# Patient Record
Sex: FEMALE | Race: WHITE | NOT HISPANIC OR LATINO | Employment: OTHER | URBAN - METROPOLITAN AREA
[De-identification: names, ages, dates, MRNs, and addresses within clinical notes are randomized per-mention and may not be internally consistent; named-entity substitution may affect disease eponyms.]

---

## 2019-03-16 ENCOUNTER — HOME CARE/HOSPICE - HEALTHEAST (OUTPATIENT)
Dept: HOME HEALTH SERVICES | Facility: HOME HEALTH | Age: 64
End: 2019-03-16

## 2019-03-20 ENCOUNTER — OFFICE VISIT (OUTPATIENT)
Dept: FAMILY MEDICINE | Facility: CLINIC | Age: 64
End: 2019-03-20
Payer: COMMERCIAL

## 2019-03-20 VITALS
WEIGHT: 151 LBS | SYSTOLIC BLOOD PRESSURE: 122 MMHG | DIASTOLIC BLOOD PRESSURE: 74 MMHG | HEART RATE: 71 BPM | OXYGEN SATURATION: 97 % | RESPIRATION RATE: 16 BRPM | TEMPERATURE: 97.7 F

## 2019-03-20 DIAGNOSIS — Z76.89 ENCOUNTER TO ESTABLISH CARE: Primary | ICD-10-CM

## 2019-03-20 DIAGNOSIS — Z09 HOSPITAL DISCHARGE FOLLOW-UP: ICD-10-CM

## 2019-03-20 DIAGNOSIS — R42 VERTIGO: ICD-10-CM

## 2019-03-20 NOTE — PROGRESS NOTES
Preceptor Attestation:   Patient seen, evaluated and discussed with the resident. I have verified the content of the note, which accurately reflects my assessment of the patient and the plan of care.   Supervising Physician:  Abilio Morales MD

## 2019-03-20 NOTE — PROGRESS NOTES
SUBJECTIVE       Gayle Issa is a 64 year old  female with a PMHx significant for   Patient Active Problem List   Diagnosis     Alcohol abuse     Sleep apnea     BPPV (benign paroxysmal positional vertigo)     Carpal tunnel syndrome     Chronic kidney disease     Depressive disorder     DJD (degenerative joint disease)     Essential hypertension     Generalized anxiety disorder     History of bariatric surgery     Mixed hyperlipidemia     Severe recurrent major depression without psychotic features (H)     Who presents today to establish care the for hospital admission follow up.      Gayle was recently admitted to the hospital for ETOH withdrawal, dizziness, and balance issues. Head CTA was negative for acute pathology.  Her symptoms were though to be secondary to either alcohol, BPPV, or hyponatremia, although she continued to have symptoms following correction of sodium and after withdrawing. She was recommended to go to TCU, however, insurance would not cover it so she was discharged to home with home PT/OT. She was prescribed meclizine for the dizziness. She additionally had acute on chronic kidney injury in addition was restarted on her home antihypertensive medications as she was not taking them prior to admission.     Today, she continues to have dizziness at times in addition to trouble focusing. No falls. No headaches. She states she has not drank alcohol since admission. She was initially said alcohol was not a problem for her but then stated that she had a relapse of alcohol prior to admission. She states she has never been through treatment and is not interested because she does not have a problem. In regards to her dizziness, she feels improved with the meclizine but is only taking it once in the morning and not as needed.     She repeatedly asked me for a work note as she states there is no way she can to back to work in the next few days. She had a note from the hospital to give her off  through 3/23/19. She states she cannot focus to do her job currently.     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       ROS: As stated in HPI.    Current medications include:   Current Outpatient Medications   Medication Sig Dispense Refill     albuterol (PROVENTIL HFA) 108 (90 Base) MCG/ACT inhaler Inhale 2 puffs into the lungs       amLODIPine (NORVASC) 10 MG tablet Take 10 mg by mouth       calcium carbonate 500 mg, elemental, (OSCAL 500) 1250 (500 Ca) MG TABS tablet Take 500 mg by mouth       cholecalciferol (VITAMIN D-1000 MAX ST) 1000 units TABS Take 1,000 Units by mouth       cyanocobalamin (VITAMIN B-12) 1000 MCG tablet Take 1,000 mcg by mouth       ferrous sulfate (FEROSUL) 325 (65 Fe) MG tablet Take 325 mg by mouth       folic acid (FOLVITE) 1 MG tablet Take 1 mg by mouth       hydrochlorothiazide (HYDRODIURIL) 25 MG tablet Take 25 mg by mouth       lisinopril (PRINIVIL/ZESTRIL) 20 MG tablet Take 20 mg by mouth       meclizine (ANTIVERT) 25 MG tablet Take 25 mg by mouth       multivitamin (ONE-DAILY) tablet Take 1 tablet by mouth       prochlorperazine (COMPAZINE) 10 MG tablet Take 10 mg by mouth       traZODone (DESYREL) 150 MG tablet Take 150 mg by mouth       varenicline (CHANTIX) 1 MG tablet Take 1 mg by mouth       vitamin B1 (THIAMINE) 100 MG tablet Take 100 mg by mouth       naproxen sodium (ANAPROX) 220 MG tablet Take 220 mg by mouth         PMH, Medications and Allergies were reviewed and updated as needed.        OBJECTIVE     Vitals:    03/20/19 1520   BP: 122/74   Pulse: 71   Resp: 16   Temp: 97.7  F (36.5  C)   TempSrc: Oral   SpO2: 97%   Weight: 68.5 kg (151 lb)     There is no height or weight on file to calculate BMI.    Gen:  Sitting in exam chair, pleasant, NAD  HEENT: Normocephalic, atraumatic. No scleral icterus. Mucous  membranes moist  Neck: Supple. No anterior cervical lymphadenopathy  CV:  RRR. No murmurs, rubs, or gallops. Good peripheral pulses  Pulm:  CTAB, no wheezes, rales, or rhonchi  ABD: Bowel sounds present. Abdomen soft and nontender throughout, no masses or rebound  Extrem: Warm and well perfused. Strength appears normal  Neuro: Alert, oriented to person, place, and time. PERRL. She had a difficult time with extra ocular muscle testing as she couldn't track fully but id did appear extra ocular muscles were intact. Ataxia with finger nose testing. Muscle strength 5/5 bilaterally.   Psych: Appeared anxious. Tangential.     No results found for this or any previous visit (from the past 24 hour(s)).    ASSESSMENT AND PLAN     1. Encounter to establish care  PMHx, PSHx, meds, allergies, and social history reviewed.     2. Hospital discharge follow-up  Recently discharged from [facility] for vergito and ETOH withdrawal. Upon chart review it appears she has more of an alcohol problem than she likes to admit, she has been seen in the ED for alcohol/withdrawal 3 times in the past few months. Wrote her a letter to extend her time off work for 4 more days, until 3/27/2019 given her difficulty with focusing.     3. Vertigo  Her symptoms do improve with meclizine and thus recommended to take this as needed and not just once a day in the morning. She had a head CTA in the hospital did not show any acute pathology, although may consider MR in the future if she does not improve. I do think alcohol or werneke's encephalopathy may be playing a role given ataxia on exam, gait difficulties, and difficulties with concentration. She did receive thiamin while in the hospital and was discharged with thiamin supplements. BPPV may also be playing a role. We discussed to continue the meclizine and thiamin and to have close follow up in one week with a provider from the hospital. She agreed with this plan.       RTC in one week for follow up of  vertigo or sooner if develops new or worsening symptoms. Return precautions discussed.     Discussed with MD Doe Burton, PGY-2  [facility]

## 2019-03-20 NOTE — LETTER
[facility]  [address] [address]  Phone: [phone]  Fax: [phone]    March 20, 2019        Gayle Issa  [address]  [place] [address]          To whom it may concern:    RE: Gayle Issa    Patient was seen and treated today at our clinic and missed work. She was hospitalized from 3/1/ - 3/16. In my medical opinion she can return to work on  3/27/2019.    Please contact me for questions or concerns.      Sincerely,        Doe Koenig MD

## 2019-03-21 ENCOUNTER — DOCUMENTATION ONLY (OUTPATIENT)
Dept: FAMILY MEDICINE | Facility: CLINIC | Age: 64
End: 2019-03-21

## 2019-03-21 PROBLEM — Z98.84 HISTORY OF BARIATRIC SURGERY: Status: ACTIVE | Noted: 2018-11-28

## 2019-03-21 PROBLEM — H81.10 BPPV (BENIGN PAROXYSMAL POSITIONAL VERTIGO): Status: ACTIVE | Noted: 2019-03-14

## 2019-03-21 PROBLEM — N18.9 CHRONIC KIDNEY DISEASE: Status: ACTIVE | Noted: 2019-03-21

## 2019-03-21 PROBLEM — G56.00 CARPAL TUNNEL SYNDROME: Status: ACTIVE | Noted: 2018-11-14

## 2019-03-21 RX ORDER — FOLIC ACID 1 MG/1
1 TABLET ORAL
COMMUNITY
Start: 2019-03-17 | End: 2019-03-27

## 2019-03-21 RX ORDER — NAPROXEN SODIUM 220 MG
220 TABLET ORAL
COMMUNITY

## 2019-03-21 RX ORDER — FERROUS SULFATE 325(65) MG
325 TABLET ORAL
COMMUNITY
Start: 2019-03-16 | End: 2019-03-27

## 2019-03-21 RX ORDER — PROCHLORPERAZINE MALEATE 10 MG
10 TABLET ORAL
COMMUNITY
Start: 2019-03-16 | End: 2019-03-27

## 2019-03-21 RX ORDER — MECLIZINE HYDROCHLORIDE 25 MG/1
25 TABLET ORAL
COMMUNITY
Start: 2019-03-16 | End: 2019-03-27

## 2019-03-21 RX ORDER — VARENICLINE TARTRATE 1 MG/1
1 TABLET, FILM COATED ORAL
COMMUNITY
Start: 2018-11-07

## 2019-03-21 RX ORDER — LISINOPRIL 20 MG/1
20 TABLET ORAL
COMMUNITY
Start: 2018-11-07

## 2019-03-21 RX ORDER — ALBUTEROL SULFATE 90 UG/1
2 AEROSOL, METERED RESPIRATORY (INHALATION)
COMMUNITY
Start: 2019-01-04 | End: 2019-03-27

## 2019-03-21 RX ORDER — TRAZODONE HYDROCHLORIDE 150 MG/1
150 TABLET ORAL
COMMUNITY
Start: 2018-11-07

## 2019-03-21 RX ORDER — IBUPROFEN 200 MG
500 CAPSULE ORAL
COMMUNITY
Start: 2019-03-16 | End: 2019-03-27

## 2019-03-21 RX ORDER — HYDROCHLOROTHIAZIDE 25 MG/1
25 TABLET ORAL
COMMUNITY
Start: 2019-03-16

## 2019-03-21 RX ORDER — LANOLIN ALCOHOL/MO/W.PET/CERES
1000 CREAM (GRAM) TOPICAL
COMMUNITY
Start: 2019-03-16 | End: 2019-03-27

## 2019-03-21 RX ORDER — AMLODIPINE BESYLATE 10 MG/1
10 TABLET ORAL
COMMUNITY
Start: 2019-03-17 | End: 2019-03-27

## 2019-03-21 RX ORDER — LANOLIN ALCOHOL/MO/W.PET/CERES
100 CREAM (GRAM) TOPICAL
COMMUNITY
Start: 2019-03-17 | End: 2019-03-27

## 2019-03-21 RX ORDER — MULTIVITAMIN
1 TABLET ORAL
COMMUNITY
Start: 2019-03-16 | End: 2019-03-27

## 2019-03-21 NOTE — PROGRESS NOTES
Occupational Therapy treatment to begin 3/24/19 for 1w2 cognition and care giver education Ana Koo, OTR/L  [phone]

## 2019-03-27 ENCOUNTER — OFFICE VISIT (OUTPATIENT)
Dept: FAMILY MEDICINE | Facility: CLINIC | Age: 64
End: 2019-03-27
Payer: COMMERCIAL

## 2019-03-27 VITALS
OXYGEN SATURATION: 99 % | RESPIRATION RATE: 14 BRPM | WEIGHT: 150 LBS | TEMPERATURE: 97.8 F | HEART RATE: 65 BPM | SYSTOLIC BLOOD PRESSURE: 102 MMHG | DIASTOLIC BLOOD PRESSURE: 62 MMHG

## 2019-03-27 DIAGNOSIS — H81.12 BENIGN PAROXYSMAL POSITIONAL VERTIGO OF LEFT EAR: Primary | ICD-10-CM

## 2019-03-27 DIAGNOSIS — I10 ESSENTIAL HYPERTENSION: ICD-10-CM

## 2019-03-27 DIAGNOSIS — F10.10 ALCOHOL ABUSE: ICD-10-CM

## 2019-03-27 DIAGNOSIS — Z98.84 H/O GASTRIC BYPASS: ICD-10-CM

## 2019-03-27 RX ORDER — FERROUS SULFATE 325(65) MG
325 TABLET ORAL
Qty: 90 TABLET | Refills: 3 | Status: SHIPPED | OUTPATIENT
Start: 2019-03-27

## 2019-03-27 RX ORDER — IBUPROFEN 200 MG
1-2 CAPSULE ORAL DAILY
Qty: 90 TABLET | Refills: 3 | Status: SHIPPED | OUTPATIENT
Start: 2019-03-27

## 2019-03-27 RX ORDER — LANOLIN ALCOHOL/MO/W.PET/CERES
100 CREAM (GRAM) TOPICAL DAILY
Qty: 90 TABLET | Refills: 3 | Status: SHIPPED | OUTPATIENT
Start: 2019-03-27

## 2019-03-27 RX ORDER — FOLIC ACID 1 MG/1
1 TABLET ORAL DAILY
Qty: 90 TABLET | Refills: 3 | Status: SHIPPED | OUTPATIENT
Start: 2019-03-27 | End: 2020-03-04

## 2019-03-27 RX ORDER — MECLIZINE HYDROCHLORIDE 25 MG/1
25 TABLET ORAL DAILY
Qty: 30 TABLET | Refills: 1 | Status: SHIPPED | OUTPATIENT
Start: 2019-03-27

## 2019-03-27 RX ORDER — LANOLIN ALCOHOL/MO/W.PET/CERES
1000 CREAM (GRAM) TOPICAL DAILY
Qty: 90 TABLET | Refills: 3 | Status: SHIPPED | OUTPATIENT
Start: 2019-03-27

## 2019-03-27 RX ORDER — AMLODIPINE BESYLATE 10 MG/1
10 TABLET ORAL DAILY
Qty: 90 TABLET | Refills: 3 | Status: SHIPPED | OUTPATIENT
Start: 2019-03-27 | End: 2019-11-21

## 2019-03-27 RX ORDER — PROCHLORPERAZINE MALEATE 10 MG
10 TABLET ORAL EVERY 8 HOURS PRN
Qty: 30 TABLET | Refills: 1 | Status: SHIPPED | OUTPATIENT
Start: 2019-03-27

## 2019-03-27 RX ORDER — ALBUTEROL SULFATE 90 UG/1
2 AEROSOL, METERED RESPIRATORY (INHALATION) EVERY 4 HOURS PRN
Qty: 8.5 G | Refills: 3 | Status: SHIPPED | OUTPATIENT
Start: 2019-03-27

## 2019-03-27 RX ORDER — MULTIVITAMIN
1 TABLET ORAL 2 TIMES DAILY
Qty: 180 TABLET | Refills: 3 | Status: SHIPPED | OUTPATIENT
Start: 2019-03-27

## 2019-03-27 NOTE — PATIENT INSTRUCTIONS
"OCCUPATIONAL THERAPY REFERRAL  March 27, 2019 at 2:58 pm  Called and spoke with Ana Koo, OTR/L  [phone] to find out which Home Care patient is using - verified it [facility]. Per Ana Koo, OTR/L verified the order is visible to them within Epic and \"we're on it!\" kd  "

## 2019-03-27 NOTE — PROGRESS NOTES
SUBJECTIVE       Gayle Issa is a 64 year old  female with a PMH significant for:     Patient Active Problem List   Diagnosis     Alcohol abuse     Sleep apnea     BPPV (benign paroxysmal positional vertigo)     Carpal tunnel syndrome     Chronic kidney disease     Depressive disorder     DJD (degenerative joint disease)     Essential hypertension     Generalized anxiety disorder     History of bariatric surgery     Mixed hyperlipidemia     Severe recurrent major depression without psychotic features (H)     She presents for follow-up of her dizziness.  Patient also had multiple general health questions.    Patient states her dizziness is drastically improved.  She is still noticing it when she walks, turned, and lays down.  Episodes last a few minutes.  She describes it as dizziness in her brain.  Patient has been taking the meclizine and Compazine as needed.  Both of these are helping.  Patient has also noticed some ringing in her ears.  It is worse on the right.  In addition it is worse when she takes her medications.  She has never had an audiogram in the past.  On chart review patient had a negative CT when she was seen at [facility].  Patient is needing refills of all of her vitamins.  States she still has plenty of her blood pressure medication and trazodone which she gets from [facility].    Patient states she takes Aleve once daily for her arthritis.  She notes that this is better given the history of her gastric bypass.  We discussed risks and benefits.  Patient has been taking this medication for years.  Discussed warning signs of GI bleed advised patient to use the medication sparingly.  Patient will continue to monitor symptoms.    Patient is wondering if she has ADD.  Discussed the workup for ADD and treatment options.  Advised that we do not use stimulants in older adults.  Given that this is not affecting her daily life would not follow through with workup today.    PMH,  Medications and Allergies were reviewed and updated as needed.        REVIEW OF SYSTEMS     CONSTITUTIONAL: NEGATIVE for fever, chills  INTEGUMENTARY/SKIN: NEGATIVE for worrisome rashes, moles or lesions  EYES: NEGATIVE for vision changes or irritation  ENT/MOUTH: NEGATIVE for ear, mouth and throat problems  RESP: NEGATIVE for significant cough or SOB  CV: NEGATIVE for chest pain, palpitations or peripheral edema  GI: NEGATIVE for nausea, abdominal pain, heartburn, or change in bowel habits  : NEGATIVE for frequency, dysuria, or hematuria  MUSCULOSKELETAL: NEGATIVE for significant arthralgias or myalgia  NEURO: NEGATIVE for weakness or paresthesias.         OBJECTIVE     Vitals:    03/27/19 1108   BP: 102/62   Pulse: 65   Resp: 14   Temp: 97.8  F (36.6  C)   TempSrc: Oral   SpO2: 99%   Weight: 68 kg (150 lb)     There is no height or weight on file to calculate BMI.    Constitutional: Awake, alert, cooperative, no apparent distress, and appears stated age.  Eyes: Lids and lashes normal, sclera clear, conjunctiva normal.  ENT: Normocephalic, without obvious abnormality, atramatic,   Lungs: No increased work of breathing, good air exchange, clear to auscultation bilaterally, no crackles or wheezing.  Cardiovascular: Regular rate and rhythm, normal S1 and S2, no S3 or S4, and no murmur noted.  Abdomen: No scars, normal bowel sounds, soft, non-distended, non-tender, no masses palpated, no hepatosplenomegally.  Musculoskeletal: No redness, warmth, or swelling of the joints.  Full range of motion noted.   Neurologic: Awake, alert, oriented to name, place and time.  Cranial nerves II-XII are grossly intact and gait is normal.  Biceps and patellar DTRs 2+ bilaterally. Positive Duffield-Hallpike on the left.  Negative Pallavi-Hallpike on the right    No results found for this or any previous visit (from the past 24 hour(s)).    ASSESSMENT AND PLAN     Gayle was seen today for recheck and medication refill.    Diagnoses and all  orders for this visit:     Benign paroxysmal positional vertigo of left ear: Dizziness has improved with abstaining from alcohol and vitamin replacement.  Originally likely multifactorial.  History currently weighs in favor of BPPV.  Pallavi-Hallpike positive on the left.  Will have occupational therapist's do Apley's maneuver at home.  Patient currently has occupational therapy at home after discharge from the hospital.  -     meclizine (ANTIVERT) 25 MG tablet; Take 1 tablet (25 mg) by mouth daily  -     prochlorperazine (COMPAZINE) 10 MG tablet; Take 1 tablet (10 mg) by mouth every 8 hours as needed for nausea or vomiting  -     OCCUPATIONAL THERAPY REFERRAL; Future    Alcohol abuse: Patient states she is done drinking.  Advised to continue vitamin replacement  -     albuterol (PROVENTIL HFA) 108 (90 Base) MCG/ACT inhaler; Inhale 2 puffs into the lungs every 4 hours as needed for shortness of breath / dyspnea or wheezing  -     calcium carbonate 500 mg, elemental, (OSCAL 500) 1250 (500 Ca) MG TABS tablet; Take 1-2 tablets (500-1,000 mg) by mouth daily  -     cholecalciferol (VITAMIN D-1000 MAX ST) 1000 units TABS; Take 1,000 Units by mouth daily  -     cyanocobalamin (VITAMIN B-12) 1000 MCG tablet; Take 1 tablet (1,000 mcg) by mouth daily  -     ferrous sulfate (FEROSUL) 325 (65 Fe) MG tablet; Take 1 tablet (325 mg) by mouth daily (with breakfast)  -     folic acid (FOLVITE) 1 MG tablet; Take 1 tablet (1 mg) by mouth daily  -     meclizine (ANTIVERT) 25 MG tablet; Take 1 tablet (25 mg) by mouth daily  -     multivitamin (ONE-DAILY) tablet; Take 1 tablet by mouth 2 times daily  -     prochlorperazine (COMPAZINE) 10 MG tablet; Take 1 tablet (10 mg) by mouth every 8 hours as needed for nausea or vomiting  -     vitamin B1 (THIAMINE) 100 MG tablet; Take 1 tablet (100 mg) by mouth daily      Essential hypertension: provided refill  -     amLODIPine (NORVASC) 10 MG tablet; Take 1 tablet (10 mg) by mouth daily    H/O gastric  bypass: Advised to continue vitamin replacement  -     albuterol (PROVENTIL HFA) 108 (90 Base) MCG/ACT inhaler; Inhale 2 puffs into the lungs every 4 hours as needed for shortness of breath / dyspnea or wheezing  -     calcium carbonate 500 mg, elemental, (OSCAL 500) 1250 (500 Ca) MG TABS tablet; Take 1-2 tablets (500-1,000 mg) by mouth daily  -     cholecalciferol (VITAMIN D-1000 MAX ST) 1000 units TABS; Take 1,000 Units by mouth daily  -     cyanocobalamin (VITAMIN B-12) 1000 MCG tablet; Take 1 tablet (1,000 mcg) by mouth daily  -     ferrous sulfate (FEROSUL) 325 (65 Fe) MG tablet; Take 1 tablet (325 mg) by mouth daily (with breakfast)  -     folic acid (FOLVITE) 1 MG tablet; Take 1 tablet (1 mg) by mouth daily  -     multivitamin (ONE-DAILY) tablet; Take 1 tablet by mouth 2 times daily  -     vitamin B1 (THIAMINE) 100 MG tablet; Take 1 tablet (100 mg) by mouth daily  -     OCCUPATIONAL THERAPY REFERRAL; Future    RTC in after OT therapy for follow up of dizziness or sooner if develops new or worsening symptoms.  I discussed the patient with  who is in agreement with the assessment and plan.   Latrice Hale

## 2019-03-28 NOTE — PROGRESS NOTES
Preceptor Attestation:   Patient seen, evaluated and discussed with the resident. I have verified the content of the note, which accurately reflects my assessment of the patient and the plan of care.   Supervising Physician:  Sotero Saenz MD

## 2019-04-11 ENCOUNTER — APPOINTMENT (OUTPATIENT)
Dept: CT IMAGING | Facility: CLINIC | Age: 64
End: 2019-04-11
Attending: FAMILY MEDICINE
Payer: COMMERCIAL

## 2019-04-11 ENCOUNTER — HOSPITAL ENCOUNTER (EMERGENCY)
Facility: CLINIC | Age: 64
Discharge: HOME | End: 2019-04-11
Attending: FAMILY MEDICINE | Admitting: FAMILY MEDICINE
Payer: COMMERCIAL

## 2019-04-11 VITALS
RESPIRATION RATE: 18 BRPM | HEART RATE: 74 BPM | DIASTOLIC BLOOD PRESSURE: 76 MMHG | SYSTOLIC BLOOD PRESSURE: 155 MMHG | WEIGHT: 165 LBS | OXYGEN SATURATION: 100 % | TEMPERATURE: 97.8 F

## 2019-04-11 DIAGNOSIS — F10.20 ALCOHOLISM (H): ICD-10-CM

## 2019-04-11 LAB
ALBUMIN SERPL-MCNC: 2.8 G/DL (ref 3.4–5)
ALBUMIN UR-MCNC: 10 MG/DL
ALCOHOL BREATH TEST: 0.11 (ref 0–0.01)
ALP SERPL-CCNC: 72 U/L (ref 40–150)
ALT SERPL W P-5'-P-CCNC: 17 U/L (ref 0–50)
AMPHETAMINES UR QL SCN: NEGATIVE
ANION GAP SERPL CALCULATED.3IONS-SCNC: 10 MMOL/L (ref 3–14)
APPEARANCE UR: CLEAR
AST SERPL W P-5'-P-CCNC: 20 U/L (ref 0–45)
BACTERIA #/AREA URNS HPF: ABNORMAL /HPF
BARBITURATES UR QL: NEGATIVE
BASOPHILS # BLD AUTO: 0 10E9/L (ref 0–0.2)
BASOPHILS NFR BLD AUTO: 0.3 %
BENZODIAZ UR QL: NEGATIVE
BILIRUB SERPL-MCNC: 0.2 MG/DL (ref 0.2–1.3)
BILIRUB UR QL STRIP: NEGATIVE
BUN SERPL-MCNC: 44 MG/DL (ref 7–30)
CALCIUM SERPL-MCNC: 7.6 MG/DL (ref 8.5–10.1)
CANNABINOIDS UR QL SCN: NEGATIVE
CHLORIDE SERPL-SCNC: 114 MMOL/L (ref 94–109)
CO2 SERPL-SCNC: 21 MMOL/L (ref 20–32)
COCAINE UR QL: NEGATIVE
COLOR UR AUTO: ABNORMAL
CREAT SERPL-MCNC: 1.61 MG/DL (ref 0.52–1.04)
DIFFERENTIAL METHOD BLD: ABNORMAL
EOSINOPHIL # BLD AUTO: 0.2 10E9/L (ref 0–0.7)
EOSINOPHIL NFR BLD AUTO: 1.3 %
ERYTHROCYTE [DISTWIDTH] IN BLOOD BY AUTOMATED COUNT: 13 % (ref 10–15)
ETHANOL SERPL-MCNC: 0.14 G/DL
ETHANOL UR QL SCN: POSITIVE
GFR SERPL CREATININE-BSD FRML MDRD: 33 ML/MIN/{1.73_M2}
GLUCOSE SERPL-MCNC: 151 MG/DL (ref 70–99)
GLUCOSE UR STRIP-MCNC: NEGATIVE MG/DL
HCT VFR BLD AUTO: 38.7 % (ref 35–47)
HGB BLD-MCNC: 12.2 G/DL (ref 11.7–15.7)
HGB UR QL STRIP: NEGATIVE
HYALINE CASTS #/AREA URNS LPF: 1 /LPF (ref 0–2)
IMM GRANULOCYTES # BLD: 0 10E9/L (ref 0–0.4)
IMM GRANULOCYTES NFR BLD: 0.2 %
INTERPRETATION ECG - MUSE: NORMAL
KETONES UR STRIP-MCNC: NEGATIVE MG/DL
LEUKOCYTE ESTERASE UR QL STRIP: ABNORMAL
LYMPHOCYTES # BLD AUTO: 3.9 10E9/L (ref 0.8–5.3)
LYMPHOCYTES NFR BLD AUTO: 33.6 %
MCH RBC QN AUTO: 30.1 PG (ref 26.5–33)
MCHC RBC AUTO-ENTMCNC: 31.5 G/DL (ref 31.5–36.5)
MCV RBC AUTO: 96 FL (ref 78–100)
MONOCYTES # BLD AUTO: 0.4 10E9/L (ref 0–1.3)
MONOCYTES NFR BLD AUTO: 3.3 %
MUCOUS THREADS #/AREA URNS LPF: PRESENT /LPF
NEUTROPHILS # BLD AUTO: 7.2 10E9/L (ref 1.6–8.3)
NEUTROPHILS NFR BLD AUTO: 61.3 %
NITRATE UR QL: NEGATIVE
NRBC # BLD AUTO: 0 10*3/UL
NRBC BLD AUTO-RTO: 0 /100
OPIATES UR QL SCN: NEGATIVE
PH UR STRIP: 5 PH (ref 5–7)
PLATELET # BLD AUTO: 348 10E9/L (ref 150–450)
POTASSIUM SERPL-SCNC: 5.7 MMOL/L (ref 3.4–5.3)
PROT SERPL-MCNC: 5.7 G/DL (ref 6.8–8.8)
RBC # BLD AUTO: 4.05 10E12/L (ref 3.8–5.2)
RBC #/AREA URNS AUTO: 2 /HPF (ref 0–2)
SODIUM SERPL-SCNC: 145 MMOL/L (ref 133–144)
SOURCE: ABNORMAL
SP GR UR STRIP: 1.01 (ref 1–1.03)
SQUAMOUS #/AREA URNS AUTO: 2 /HPF (ref 0–1)
UROBILINOGEN UR STRIP-MCNC: NORMAL MG/DL (ref 0–2)
WBC # BLD AUTO: 11.7 10E9/L (ref 4–11)
WBC #/AREA URNS AUTO: 5 /HPF (ref 0–5)

## 2019-04-11 PROCEDURE — 80320 DRUG SCREEN QUANTALCOHOLS: CPT | Performed by: FAMILY MEDICINE

## 2019-04-11 PROCEDURE — 81001 URINALYSIS AUTO W/SCOPE: CPT | Mod: XU | Performed by: FAMILY MEDICINE

## 2019-04-11 PROCEDURE — 93005 ELECTROCARDIOGRAM TRACING: CPT

## 2019-04-11 PROCEDURE — 85025 COMPLETE CBC W/AUTO DIFF WBC: CPT | Performed by: FAMILY MEDICINE

## 2019-04-11 PROCEDURE — 82075 ASSAY OF BREATH ETHANOL: CPT

## 2019-04-11 PROCEDURE — 80053 COMPREHEN METABOLIC PANEL: CPT | Performed by: FAMILY MEDICINE

## 2019-04-11 PROCEDURE — 70450 CT HEAD/BRAIN W/O DYE: CPT

## 2019-04-11 PROCEDURE — 80307 DRUG TEST PRSMV CHEM ANLYZR: CPT | Performed by: FAMILY MEDICINE

## 2019-04-11 PROCEDURE — 99285 EMERGENCY DEPT VISIT HI MDM: CPT | Mod: 25

## 2019-04-11 ASSESSMENT — ENCOUNTER SYMPTOMS
VOMITING: 0
MYALGIAS: 0
TREMORS: 0
CONFUSION: 1
NERVOUS/ANXIOUS: 0
CHILLS: 0
PALPITATIONS: 0
FATIGUE: 0
HALLUCINATIONS: 0
WEAKNESS: 0
LIGHT-HEADEDNESS: 1
SEIZURES: 0
NAUSEA: 0
SHORTNESS OF BREATH: 0
DYSURIA: 0
ABDOMINAL PAIN: 0
FEVER: 0
FLANK PAIN: 0
COUGH: 0
FACIAL SWELLING: 0
HEADACHES: 0
DIZZINESS: 0

## 2019-04-11 NOTE — ED PROVIDER NOTES
History     Chief Complaint   Patient presents with     Dizziness     States that Dr. Rios from [facility] wanted her to be evaluated.  She is not sure why and was unable to provide further details.  upon further questioning, she is having balance issues. Her spouse stated she has fallen because of drinking alcohol. Does not drink dialy.     HPI  Gayle Issa is a 64 year old female who presents to the ED with her fiance due to increased confusion and falling. She is unsure why she is here, and notes she fell last night but cannot recall the fall. The patient drinks frequently, and fiance states she did drink yesterday but not today. She is brought here by her fiance and he reports concerns of her increased memory problems. This morning she was in the kitchen trying to open a can of tuna unsuccessfully. He states her confusion has been worsening for over 6 months, and states it's becoming unsafe. They had an appointment today with a primary clinic but was late  and they sent her here.Apparently could not be worked into schedule. Her fiance notes she was in [facility] and states it stopped Friday.  No hx of seizures or dt's. Long standing tobacco. No other street drug use.     Again the pt is insistent that she has not drank today.    She has hypertension= amlodipine and lisinopril and hydrochlorothiazide  Hx of vertigo prn meclizine    I have reviewed the Medications, Allergies, Past Medical and Surgical History, and Social History in the [employer] system.  Past Medical History:   Diagnosis Date     Alcohol use disorder, moderate, dependence (H)      CKD (chronic kidney disease)      Hypertension      Osteoarthritis        No past surgical history on file.    No family history on file.    Social History     Tobacco Use     Smoking status: Light Tobacco Smoker     Smokeless tobacco: Never Used   Substance Use Topics     Alcohol use: Not on file   Smoker for years    No current facility-administered  medications for this encounter.      Current Outpatient Medications   Medication     albuterol (PROVENTIL HFA) 108 (90 Base) MCG/ACT inhaler     amLODIPine (NORVASC) 10 MG tablet     calcium carbonate 500 mg, elemental, (OSCAL 500) 1250 (500 Ca) MG TABS tablet     cholecalciferol (VITAMIN D-1000 MAX ST) 1000 units TABS     cyanocobalamin (VITAMIN B-12) 1000 MCG tablet     ferrous sulfate (FEROSUL) 325 (65 Fe) MG tablet     folic acid (FOLVITE) 1 MG tablet     hydrochlorothiazide (HYDRODIURIL) 25 MG tablet     lisinopril (PRINIVIL/ZESTRIL) 20 MG tablet     meclizine (ANTIVERT) 25 MG tablet     multivitamin (ONE-DAILY) tablet     naproxen sodium (ANAPROX) 220 MG tablet     prochlorperazine (COMPAZINE) 10 MG tablet     traZODone (DESYREL) 150 MG tablet     varenicline (CHANTIX) 1 MG tablet     vitamin B1 (THIAMINE) 100 MG tablet      No Known Allergies    Review of Systems   Constitutional: Negative for chills, fatigue, fever and unexpected weight change.   HENT: Negative for congestion and facial swelling.    Respiratory: Negative for cough and shortness of breath.    Cardiovascular: Negative for chest pain, palpitations and leg swelling.   Gastrointestinal: Negative for abdominal pain, nausea and vomiting.   Genitourinary: Negative for dysuria and flank pain.   Musculoskeletal: Negative for back pain and myalgias.   Skin: Negative for rash.   Allergic/Immunologic: Negative for immunocompromised state.   Neurological: Positive for light-headedness. Negative for dizziness, tremors, seizures, weakness and headaches.   Hematological: Negative for adenopathy. Does not bruise/bleed easily.   Psychiatric/Behavioral: Positive for confusion. Negative for hallucinations. The patient is not nervous/anxious.    All other systems reviewed and are negative.      Physical Exam   BP: 141/65  Pulse: 74  Temp: 96.8  F (36  C)  Resp: 18  Weight: 74.8 kg (165 lb)  SpO2: 97 %      Physical Exam   Constitutional: She is oriented to  person, place, and time. She appears well-developed and well-nourished. No distress.   Pleasant  Frequent laughing  Moving all extremities on command  Alert and O X 3.    SHE SMELLS OF ETOH!!!   HENT:   Head: Normocephalic.   Nose: Nose normal.   Small bruise on forehead- seems related to falls yesterday   Eyes: Pupils are equal, round, and reactive to light.   Neck: Normal range of motion. Neck supple. No thyromegaly present.   Cardiovascular: Normal rate, regular rhythm, normal heart sounds and intact distal pulses.   No murmur heard.  Pulmonary/Chest: Effort normal and breath sounds normal. No stridor. No respiratory distress. She has no wheezes. She has no rales.   Abdominal: Soft. She exhibits no distension and no mass. There is no tenderness. There is no rebound. No hernia.   Liver is generous but not tender   Musculoskeletal: She exhibits edema (mild bilaterl). She exhibits no tenderness or deformity.   Lymphadenopathy:     She has no cervical adenopathy.   Neurological: She is alert and oriented to person, place, and time. She displays normal reflexes. No cranial nerve deficit or sensory deficit. Coordination normal.   Ambulatory without help  stable   Skin: Skin is warm and dry. She is not diaphoretic.   Psychiatric: She has a normal mood and affect. Her behavior is normal. Judgment and thought content normal.   Giggling and laughing at times       ED Course        Procedures        EKG done. NSR rate in70s. No acute st/t changes. Intervals are normal. This is a normal ekg and does not explain falling  CT shows no acute bleeds or acute issues    Labs Ordered and Resulted from Time of ED Arrival Up to the Time of Departure from the ED   CBC WITH PLATELETS DIFFERENTIAL - Abnormal; Notable for the following components:       Result Value    WBC 11.7 (*)     All other components within normal limits   COMPREHENSIVE METABOLIC PANEL - Abnormal; Notable for the following components:    Sodium 145 (*)     Potassium  5.7 (*)     Chloride 114 (*)     Glucose 151 (*)     Urea Nitrogen 44 (*)     Creatinine 1.61 (*)     GFR Estimate 33 (*)     GFR Estimate If Black 38 (*)     Calcium 7.6 (*)     Albumin 2.8 (*)     Protein Total 5.7 (*)     All other components within normal limits   DRUG ABUSE SCREEN 6 CHEM DEP URINE ([facility]) - Abnormal; Notable for the following components:    Ethanol Qual Urine Positive (*)     All other components within normal limits   ROUTINE UA WITH MICROSCOPIC REFLEX TO CULTURE - Abnormal; Notable for the following components:    Protein Albumin Urine 10 (*)     Leukocyte Esterase Urine Trace (*)     Bacteria Urine Few (*)     Squamous Epithelial /HPF Urine 2 (*)     Mucous Urine Present (*)     All other components within normal limits   ALCOHOL ETHYL - Abnormal; Notable for the following components:    Ethanol g/dL 0.14 (*)     All other components within normal limits   ALCOHOL BREATH TEST POCT - Abnormal; Notable for the following components:    Alcohol Breath Test 0.108 (*)     All other components within normal limits     Creatinine is stable for pt as are remainder of chemistries       Assessments & Plan (with Medical Decision Making)   The pt is an alcoholic and she drinks daily with gross underestimates of amount and frequency of alcohol.  When told she is legally intoxicated presently the boyfriend expresses shock.  It seems rather obvious that the pt has severe alcohol problems and this is the cause of confusion and falling when acutely intoxicated.  I offered the pt detox as a window to begin treatment  She denies that she is an alcoholic and wants nothing to do with treatment or detox  She can be discharged to follow up with [facility]        I have reviewed the nursing notes.    I have reviewed the findings, diagnosis, plan and need for follow up with the patient.       Medication List      There are no discharge medications for this visit.         Final diagnoses:   Alcoholism (H)     IDanae,  am serving as a trained medical scribe to document services personally performed by Silvestre Nieto MD, based on the provider's statements to me.   I, Silvestre Nieto MD, was physically present and have reviewed and verified the accuracy of this note documented by Danae Wilburn.    4/11/2019   [facility], [place], EMERGENCY DEPARTMENT     Silvestre Nieto MD  04/13/19 7674

## 2019-04-11 NOTE — ED AVS SNAPSHOT
[facility], [place], Emergency Department  [address]  [facility] [address]  Phone:  [phone]  Fax:  [phone]                                    Gayle Issa   MRN: [id number]    Department:  [facility], Emergency Department   Date of Visit:  4/11/2019           After Visit Summary Signature Page    I have received my discharge instructions, and my questions have been answered. I have discussed any challenges I see with this plan with the nurse or doctor.    ..........................................................................................................................................  Patient/Patient Representative Signature      ..........................................................................................................................................  Patient Representative Print Name and Relationship to Patient    ..................................................               ................................................  Date                                   Time    ..........................................................................................................................................  Reviewed by Signature/Title    ...................................................              ..............................................  Date                                               Time          22EPIC Rev 08/18

## 2019-04-13 ASSESSMENT — ENCOUNTER SYMPTOMS
BRUISES/BLEEDS EASILY: 0
BACK PAIN: 0
UNEXPECTED WEIGHT CHANGE: 0
ADENOPATHY: 0

## 2019-06-05 NOTE — DISCHARGE INSTRUCTIONS
I offered you alcohol treatment but you have declined. You do not want treatment.  You are falling and confused because you drink too much alcohol and you are not telling the truth about how much you drink. In the emergency room tonight your alcohol level was 0.108 and you claim no drink for over 24 hours.  I suggest that you strongly consider treatment.  Suggest that you call  to obtain a detox bed- this is the number for [facility]  See your clinic doctor next week in follow up  Your blood work is not changed from [facility] blood work and your EKG(heart) is fine and your brain scan shows no bleeding from the falls or injury  
normal (ped)...

## 2019-11-21 DIAGNOSIS — I10 ESSENTIAL HYPERTENSION: ICD-10-CM

## 2019-11-21 RX ORDER — AMLODIPINE BESYLATE 10 MG/1
10 TABLET ORAL DAILY
Qty: 90 TABLET | Refills: 3 | Status: SHIPPED | OUTPATIENT
Start: 2019-11-21

## 2019-12-05 ENCOUNTER — TELEPHONE (OUTPATIENT)
Dept: FAMILY MEDICINE | Facility: CLINIC | Age: 64
End: 2019-12-05

## 2020-03-04 DIAGNOSIS — F10.10 ALCOHOL ABUSE: ICD-10-CM

## 2020-03-04 DIAGNOSIS — Z98.84 H/O GASTRIC BYPASS: ICD-10-CM

## 2020-03-04 RX ORDER — FOLIC ACID 1 MG/1
1 TABLET ORAL DAILY
Qty: 90 TABLET | Refills: 3 | Status: SHIPPED | OUTPATIENT
Start: 2020-03-04

## 2020-04-23 ENCOUNTER — TELEPHONE (OUTPATIENT)
Dept: FAMILY MEDICINE | Facility: CLINIC | Age: 65
End: 2020-04-23

## 2020-04-23 NOTE — TELEPHONE ENCOUNTER
Reached out to patient during COVID19 Clinic outreach. Reassured patient that [facility] is still open and has started implementing phone and video appointments to help patient remain safe at home.     Patient reports the following concerns: n/a    Per patient request, patient is scheduled for a visit to address their concerns on the following date: n/a     Offered MyChart. Patient declined. Unable to reach out.     Note will be routed to N/A  to assist in addressing patient concerns and/or to schedule a visit.     Called, no answer and left a message that we are still open. Due to coronavirus, we offer telephone visit and video visit. Sent a letter.    CARMEN Alves

## 2020-04-23 NOTE — LETTER
April 23, 2020      Gayle Issa  [address]  [place] [facility] [address]        Dear Gayle,    We tried reaching you by phone but were unable to connect with you. We are reaching out to see how you are doing. This is a very stressful time in the world, which can cause an increase in personal stress and anxiety.     Our clinic is open. We are here for you and are ready to meet all of your healthcare needs.  We have delayed preventive care until July. We want everyone who can to stay home during this time for their health and the health of all. We are now having most visits over the phone, but will see people in person if your doctor agrees that it is necessary. We also will have video visits starting on April 6, 2020.      Call us with any questions or concerns you may have, and know that we are all in this together.       Sincerely,     Your team at [facility]  [phone]

## 2021-01-07 ENCOUNTER — RECORDS - HEALTHEAST (OUTPATIENT)
Dept: LAB | Facility: CLINIC | Age: 66
End: 2021-01-07

## 2021-01-07 LAB
C DIFF TOX B STL QL: NEGATIVE
RIBOTYPE 027/NAP1/BI: NORMAL

## 2021-01-08 ENCOUNTER — RECORDS - HEALTHEAST (OUTPATIENT)
Dept: LAB | Facility: CLINIC | Age: 66
End: 2021-01-08

## 2021-01-08 LAB
SHIGA TOXIN 1: NEGATIVE
SHIGA TOXIN 2: NEGATIVE

## 2021-01-10 LAB — BACTERIA SPEC CULT: NORMAL

## 2021-01-11 LAB
ANION GAP SERPL CALCULATED.3IONS-SCNC: 10 MMOL/L (ref 5–18)
BASOPHILS # BLD AUTO: 0.1 THOU/UL (ref 0–0.2)
BASOPHILS NFR BLD AUTO: 1 % (ref 0–2)
BUN SERPL-MCNC: 34 MG/DL (ref 8–22)
CALCIUM SERPL-MCNC: 8.7 MG/DL (ref 8.5–10.5)
CHLORIDE BLD-SCNC: 104 MMOL/L (ref 98–107)
CO2 SERPL-SCNC: 24 MMOL/L (ref 22–31)
CREAT SERPL-MCNC: 1.3 MG/DL (ref 0.6–1.1)
EOSINOPHIL # BLD AUTO: 0.1 THOU/UL (ref 0–0.4)
EOSINOPHIL NFR BLD AUTO: 1 % (ref 0–6)
ERYTHROCYTE [DISTWIDTH] IN BLOOD BY AUTOMATED COUNT: 14.8 % (ref 11–14.5)
GFR SERPL CREATININE-BSD FRML MDRD: 41 ML/MIN/1.73M2
GLUCOSE BLD-MCNC: 128 MG/DL (ref 70–125)
HCT VFR BLD AUTO: 41.5 % (ref 35–47)
HGB BLD-MCNC: 12.8 G/DL (ref 12–16)
IMM GRANULOCYTES # BLD: 0.1 THOU/UL
IMM GRANULOCYTES NFR BLD: 1 %
LYMPHOCYTES # BLD AUTO: 1.6 THOU/UL (ref 0.8–4.4)
LYMPHOCYTES NFR BLD AUTO: 18 % (ref 20–40)
MCH RBC QN AUTO: 29 PG (ref 27–34)
MCHC RBC AUTO-ENTMCNC: 30.8 G/DL (ref 32–36)
MCV RBC AUTO: 94 FL (ref 80–100)
MONOCYTES # BLD AUTO: 1.2 THOU/UL (ref 0–0.9)
MONOCYTES NFR BLD AUTO: 13 % (ref 2–10)
NEUTROPHILS # BLD AUTO: 6.2 THOU/UL (ref 2–7.7)
NEUTROPHILS NFR BLD AUTO: 68 % (ref 50–70)
PLATELET # BLD AUTO: 419 THOU/UL (ref 140–440)
PMV BLD AUTO: 10.9 FL (ref 8.5–12.5)
POTASSIUM BLD-SCNC: 3.7 MMOL/L (ref 3.5–5)
RBC # BLD AUTO: 4.41 MILL/UL (ref 3.8–5.4)
SODIUM SERPL-SCNC: 138 MMOL/L (ref 136–145)
WBC: 9.2 THOU/UL (ref 4–11)

## 2021-01-13 LAB
GLIADIN IGA SER-ACNC: 0.6 U/ML
GLIADIN IGG SER-ACNC: 3.4 U/ML
IGA SERPL-MCNC: 133 MG/DL (ref 65–400)
TTG IGA SER-ACNC: 0.3 U/ML
TTG IGG SER-ACNC: <0.6 U/ML

## 2021-01-16 ENCOUNTER — RECORDS - HEALTHEAST (OUTPATIENT)
Dept: LAB | Facility: CLINIC | Age: 66
End: 2021-01-16

## 2021-01-19 LAB
ANION GAP SERPL CALCULATED.3IONS-SCNC: 8 MMOL/L (ref 5–18)
BUN SERPL-MCNC: 37 MG/DL (ref 8–22)
CALCIUM SERPL-MCNC: 9.1 MG/DL (ref 8.5–10.5)
CHLORIDE BLD-SCNC: 100 MMOL/L (ref 98–107)
CO2 SERPL-SCNC: 29 MMOL/L (ref 22–31)
CREAT SERPL-MCNC: 1.4 MG/DL (ref 0.6–1.1)
GFR SERPL CREATININE-BSD FRML MDRD: 38 ML/MIN/1.73M2
GLUCOSE BLD-MCNC: 108 MG/DL (ref 70–125)
POTASSIUM BLD-SCNC: 4.3 MMOL/L (ref 3.5–5)
SODIUM SERPL-SCNC: 137 MMOL/L (ref 136–145)